# Patient Record
Sex: MALE | Race: WHITE | NOT HISPANIC OR LATINO | URBAN - METROPOLITAN AREA
[De-identification: names, ages, dates, MRNs, and addresses within clinical notes are randomized per-mention and may not be internally consistent; named-entity substitution may affect disease eponyms.]

---

## 2019-01-18 ENCOUNTER — OFFICE VISIT (OUTPATIENT)
Dept: URGENT CARE | Facility: CLINIC | Age: 44
End: 2019-01-18

## 2019-01-18 VITALS
HEART RATE: 66 BPM | HEIGHT: 72 IN | RESPIRATION RATE: 18 BRPM | DIASTOLIC BLOOD PRESSURE: 95 MMHG | BODY MASS INDEX: 32.51 KG/M2 | SYSTOLIC BLOOD PRESSURE: 140 MMHG | TEMPERATURE: 98 F | OXYGEN SATURATION: 96 % | WEIGHT: 240 LBS

## 2019-01-18 DIAGNOSIS — B96.89 BACTERIAL SINUSITIS: Primary | ICD-10-CM

## 2019-01-18 DIAGNOSIS — J02.9 PHARYNGITIS, UNSPECIFIED ETIOLOGY: ICD-10-CM

## 2019-01-18 DIAGNOSIS — J32.9 BACTERIAL SINUSITIS: Primary | ICD-10-CM

## 2019-01-18 PROCEDURE — 99203 PR OFFICE/OUTPT VISIT, NEW, LEVL III, 30-44 MIN: ICD-10-PCS | Mod: S$GLB,,, | Performed by: PHYSICIAN ASSISTANT

## 2019-01-18 PROCEDURE — 99203 OFFICE O/P NEW LOW 30 MIN: CPT | Mod: S$GLB,,, | Performed by: PHYSICIAN ASSISTANT

## 2019-01-18 RX ORDER — PREDNISONE 20 MG/1
20 TABLET ORAL DAILY
Qty: 4 TABLET | Refills: 0 | Status: SHIPPED | OUTPATIENT
Start: 2019-01-18 | End: 2019-01-22

## 2019-01-18 RX ORDER — AMOXICILLIN AND CLAVULANATE POTASSIUM 875; 125 MG/1; MG/1
1 TABLET, FILM COATED ORAL 2 TIMES DAILY
Qty: 20 TABLET | Refills: 0 | Status: SHIPPED | OUTPATIENT
Start: 2019-01-18 | End: 2019-01-28

## 2019-01-18 RX ORDER — MUPIROCIN 20 MG/G
OINTMENT TOPICAL
Qty: 22 G | Refills: 1 | Status: SHIPPED | OUTPATIENT
Start: 2019-01-18 | End: 2019-01-18

## 2019-01-18 NOTE — PROGRESS NOTES
Subjective:       Patient ID: Wilbert Calderón Jr. is a 43 y.o. male.    Vitals:  height is 6' (1.829 m) and weight is 108.9 kg (240 lb). His temperature is 97.7 °F (36.5 °C). His blood pressure is 140/95 (abnormal) and his pulse is 66. His respiration is 18 and oxygen saturation is 96%.     Chief Complaint: URI    Pt did go to ER for same sympotms last Friday. They swab for flu but came back negative. They gave pt a penicillin shot and tamiflu. Pt was starting to feel better but symptoms came back Wednesday.       URI    This is a recurrent problem. Episode onset: 2 days. The problem has been unchanged. There has been no fever. Associated symptoms include coughing, headaches, sinus pain and a sore throat. Pertinent negatives include no congestion, ear pain, nausea, neck pain, rash, sneezing, vomiting or wheezing.   Sinus Problem   This is a new problem. The current episode started 1 to 4 weeks ago. The problem has been waxing and waning since onset. There has been no fever. His pain is at a severity of 7/10. The pain is moderate. Associated symptoms include coughing, headaches, sinus pressure and a sore throat. Pertinent negatives include no chills, congestion, diaphoresis, ear pain, neck pain, shortness of breath or sneezing.       Constitution: Negative for chills, sweating, fatigue and fever.   HENT: Positive for sinus pain, sinus pressure and sore throat. Negative for ear pain, congestion and voice change.    Neck: Negative for neck pain and painful lymph nodes.   Eyes: Negative for eye redness.   Respiratory: Positive for cough. Negative for chest tightness, sputum production, bloody sputum, COPD, shortness of breath, stridor, wheezing and asthma.    Gastrointestinal: Negative for nausea and vomiting.   Musculoskeletal: Negative for muscle ache.   Skin: Negative for rash.   Allergic/Immunologic: Negative for seasonal allergies, asthma and sneezing.   Neurological: Positive for headaches.    Hematologic/Lymphatic: Negative for swollen lymph nodes.       Objective:      Physical Exam   Constitutional: He is oriented to person, place, and time. He appears well-developed and well-nourished. He is cooperative.  Non-toxic appearance. He does not appear ill. No distress.   HENT:   Head: Normocephalic and atraumatic.   Right Ear: Hearing, tympanic membrane, external ear and ear canal normal.   Left Ear: Hearing, tympanic membrane, external ear and ear canal normal.   Nose: Mucosal edema, rhinorrhea and sinus tenderness present. No nasal deformity. No epistaxis. Right sinus exhibits maxillary sinus tenderness. Right sinus exhibits no frontal sinus tenderness. Left sinus exhibits maxillary sinus tenderness. Left sinus exhibits no frontal sinus tenderness.   Mouth/Throat: Uvula is midline and mucous membranes are normal. No trismus in the jaw. Normal dentition. No uvula swelling. Posterior oropharyngeal erythema present. Tonsils are 1+ on the right. Tonsils are 1+ on the left. No tonsillar exudate.   Eyes: Conjunctivae and lids are normal. No scleral icterus.   Sclera clear bilat   Neck: Trachea normal, full passive range of motion without pain and phonation normal. Neck supple.   Cardiovascular: Normal rate, regular rhythm, normal heart sounds, intact distal pulses and normal pulses.   Pulmonary/Chest: Effort normal and breath sounds normal. No respiratory distress.   Abdominal: Soft. Normal appearance and bowel sounds are normal. He exhibits no distension. There is no tenderness.   Musculoskeletal: Normal range of motion. He exhibits no edema or deformity.   Neurological: He is alert and oriented to person, place, and time. He exhibits normal muscle tone. Coordination normal.   Skin: Skin is warm, dry and intact. He is not diaphoretic. No pallor.   Psychiatric: He has a normal mood and affect. His speech is normal and behavior is normal. Judgment and thought content normal. Cognition and memory are normal.    Nursing note and vitals reviewed.      Assessment:       1. Bacterial sinusitis    2. Pharyngitis, unspecified etiology        Plan:         Bacterial sinusitis  -     predniSONE (DELTASONE) 20 MG tablet; Take 1 tablet (20 mg total) by mouth once daily. for 4 days  Dispense: 4 tablet; Refill: 0  -     amoxicillin-clavulanate 875-125mg (AUGMENTIN) 875-125 mg per tablet; Take 1 tablet by mouth 2 (two) times daily. for 10 days  Dispense: 20 tablet; Refill: 0  -     Discontinue: mupirocin (BACTROBAN) 2 % ointment; Apply to affected area 3 times daily  Dispense: 22 g; Refill: 1    Pharyngitis, unspecified etiology      Sinusitis (Antibiotic Treatment)    The sinuses are air-filled spaces within the bones of the face. They connect to the inside of the nose. Sinusitis is an inflammation of the tissue lining the sinus cavity. Sinus inflammation can occur during a cold. It can also be due to allergies to pollens and other particles in the air. Sinusitis can cause symptoms of sinus congestion and fullness. A sinus infection causes fever, headache and facial pain. There is often green or yellow drainage from the nose or into the back of the throat (post-nasal drip). You have been given antibiotics to treat this condition.  Home care:  · Take the full course of antibiotics as instructed. Do not stop taking them, even if you feel better.  · Drink plenty of water, hot tea, and other liquids. This may help thin mucus. It also may promote sinus drainage.  · Heat may help soothe painful areas of the face. Use a towel soaked in hot water. Or,  the shower and direct the hot spray onto your face. Using a vaporizer along with a menthol rub at night may also help.   · An expectorant containing guaifenesin may help thin the mucus and promote drainage from the sinuses.  · Over-the-counter decongestants may be used unless a similar medicine was prescribed. Nasal sprays work the fastest. Use one that contains phenylephrine or  oxymetazoline. First blow the nose gently. Then use the spray. Do not use these medicines more often than directed on the label or symptoms may get worse. You may also use tablets containing pseudoephedrine. Avoid products that combine ingredients, because side effects may be increased. Read labels. You can also ask the pharmacist for help. (NOTE: Persons with high blood pressure should not use decongestants. They can raise blood pressure.)  · Over-the-counter antihistamines may help if allergies contributed to your sinusitis.    · Do not use nasal rinses or irrigation during an acute sinus infection, unless told to by your health care provider. Rinsing may spread the infection to other sinuses.  · Use acetaminophen or ibuprofen to control pain, unless another pain medicine was prescribed. (If you have chronic liver or kidney disease or ever had a stomach ulcer, talk with your doctor before using these medicines. Aspirin should never be used in anyone under 18 years of age who is ill with a fever. It may cause severe liver damage.)  · Don't smoke. This can worsen symptoms.  Follow-up care  Follow up with your healthcare provider or our staff if you are not improving within the next week.  When to seek medical advice  Call your healthcare provider if any of these occur:  · Facial pain or headache becoming more severe  · Stiff neck  · Unusual drowsiness or confusion  · Swelling of the forehead or eyelids  · Vision problems, including blurred or double vision  · Fever of 100.4ºF (38ºC) or higher, or as directed by your healthcare provider  · Seizure  · Breathing problems  · Symptoms not resolving within 10 days  Date Last Reviewed: 4/13/2015 © 2000-2017 [employer]. [address] [place] [address], [place] [address]. All rights reserved. This information is not intended as a substitute for professional medical care. Always follow your healthcare professional's instructions.      Please follow up with your Primary  care provider within 2-5 days if your signs and symptoms have not resolved or worsen.     If your condition worsens or fails to improve we recommend that you receive another evaluation at the emergency room immediately or contact your primary medical clinic to discuss your concerns.   You must understand that you have received an Urgent Care treatment only and that you may be released before all of your medical problems are known or treated. You, the patient, will arrange for follow up care as instructed.     RED FLAGS/WARNING SYMPTOMS DISCUSSED WITH PATIENT THAT WOULD WARRANT EMERGENT MEDICAL ATTENTION. PATIENT VERBALIZED UNDERSTANDING.

## 2019-01-18 NOTE — PATIENT INSTRUCTIONS
Sinusitis (Antibiotic Treatment)    The sinuses are air-filled spaces within the bones of the face. They connect to the inside of the nose. Sinusitis is an inflammation of the tissue lining the sinus cavity. Sinus inflammation can occur during a cold. It can also be due to allergies to pollens and other particles in the air. Sinusitis can cause symptoms of sinus congestion and fullness. A sinus infection causes fever, headache and facial pain. There is often green or yellow drainage from the nose or into the back of the throat (post-nasal drip). You have been given antibiotics to treat this condition.  Home care:  · Take the full course of antibiotics as instructed. Do not stop taking them, even if you feel better.  · Drink plenty of water, hot tea, and other liquids. This may help thin mucus. It also may promote sinus drainage.  · Heat may help soothe painful areas of the face. Use a towel soaked in hot water. Or,  the shower and direct the hot spray onto your face. Using a vaporizer along with a menthol rub at night may also help.   · An expectorant containing guaifenesin may help thin the mucus and promote drainage from the sinuses.  · Over-the-counter decongestants may be used unless a similar medicine was prescribed. Nasal sprays work the fastest. Use one that contains phenylephrine or oxymetazoline. First blow the nose gently. Then use the spray. Do not use these medicines more often than directed on the label or symptoms may get worse. You may also use tablets containing pseudoephedrine. Avoid products that combine ingredients, because side effects may be increased. Read labels. You can also ask the pharmacist for help. (NOTE: Persons with high blood pressure should not use decongestants. They can raise blood pressure.)  · Over-the-counter antihistamines may help if allergies contributed to your sinusitis.    · Do not use nasal rinses or irrigation during an acute sinus infection, unless told to by  your health care provider. Rinsing may spread the infection to other sinuses.  · Use acetaminophen or ibuprofen to control pain, unless another pain medicine was prescribed. (If you have chronic liver or kidney disease or ever had a stomach ulcer, talk with your doctor before using these medicines. Aspirin should never be used in anyone under 18 years of age who is ill with a fever. It may cause severe liver damage.)  · Don't smoke. This can worsen symptoms.  Follow-up care  Follow up with your healthcare provider or our staff if you are not improving within the next week.  When to seek medical advice  Call your healthcare provider if any of these occur:  · Facial pain or headache becoming more severe  · Stiff neck  · Unusual drowsiness or confusion  · Swelling of the forehead or eyelids  · Vision problems, including blurred or double vision  · Fever of 100.4ºF (38ºC) or higher, or as directed by your healthcare provider  · Seizure  · Breathing problems  · Symptoms not resolving within 10 days  Date Last Reviewed: 4/13/2015  © 6183-1722 [employer]. [address] [place] [address]. All rights reserved. This information is not intended as a substitute for professional medical care. Always follow your healthcare professional's instructions.      Please follow up with your Primary care provider within 2-5 days if your signs and symptoms have not resolved or worsen.     If your condition worsens or fails to improve we recommend that you receive another evaluation at the emergency room immediately or contact your primary medical clinic to discuss your concerns.   You must understand that you have received an Urgent Care treatment only and that you may be released before all of your medical problems are known or treated. You, the patient, will arrange for follow up care as instructed.     RED FLAGS/WARNING SYMPTOMS DISCUSSED WITH PATIENT THAT WOULD WARRANT EMERGENT MEDICAL ATTENTION. PATIENT  VERBALIZED UNDERSTANDING.

## 2024-08-21 ENCOUNTER — OFFICE VISIT (OUTPATIENT)
Dept: URGENT CARE | Facility: CLINIC | Age: 49
End: 2024-08-21
Payer: COMMERCIAL

## 2024-08-21 VITALS
SYSTOLIC BLOOD PRESSURE: 163 MMHG | DIASTOLIC BLOOD PRESSURE: 89 MMHG | BODY MASS INDEX: 32.51 KG/M2 | HEIGHT: 72 IN | HEART RATE: 79 BPM | OXYGEN SATURATION: 97 % | TEMPERATURE: 98 F | RESPIRATION RATE: 17 BRPM | WEIGHT: 240 LBS

## 2024-08-21 DIAGNOSIS — M25.571 ACUTE RIGHT ANKLE PAIN: ICD-10-CM

## 2024-08-21 DIAGNOSIS — S93.401A SPRAIN OF RIGHT ANKLE, UNSPECIFIED LIGAMENT, INITIAL ENCOUNTER: Primary | ICD-10-CM

## 2024-08-21 PROCEDURE — 73610 X-RAY EXAM OF ANKLE: CPT | Mod: FY,RT,S$GLB, | Performed by: RADIOLOGY

## 2024-08-21 PROCEDURE — 99203 OFFICE O/P NEW LOW 30 MIN: CPT | Mod: S$GLB,,,

## 2024-08-21 PROCEDURE — 73630 X-RAY EXAM OF FOOT: CPT | Mod: FY,RT,S$GLB, | Performed by: RADIOLOGY

## 2024-08-21 RX ORDER — NAPROXEN 500 MG/1
500 TABLET ORAL 2 TIMES DAILY WITH MEALS
Qty: 30 TABLET | Refills: 0 | Status: SHIPPED | OUTPATIENT
Start: 2024-08-21

## 2024-08-21 NOTE — LETTER
August 21, 2024      [facility] [address] [facility]  [place] [address]  Phone: [phone]  Fax: [phone]       Patient: Wilbert Calderón   YOB: 1975  Date of Visit: 08/21/2024    To Whom It May Concern:    Teresa Calderón  was at [facility] on 08/21/2024. The patient may return to work/school on August 23rd, 2024 or sooner with no restrictions. If you have any questions or concerns, or if I can be of further assistance, please do not hesitate to contact me.    Sincerely,          Adolfo Orozco PA-C

## 2024-08-21 NOTE — PROGRESS NOTES
"Subjective:      Patient ID: Wilbert Calderón Jr. is a 49 y.o. male.    Vitals:  height is 6' (1.829 m) and weight is 108.9 kg (240 lb). His oral temperature is 97.8 °F (36.6 °C). His blood pressure is 163/89 (abnormal) and his pulse is 79. His respiration is 17 and oxygen saturation is 97%.     Chief Complaint: Ankle Pain    Pt is a 48 yo male presenting with R ankle pain. Onset of symptoms was yesterday after stepping down from a ladder yesterday at work.  Pt reports using OTC Ibuprofen with no relief. Denies any injury to this ankle before in the past, but has been experiencing an intermittent "shocking pain" in the same area for about 1 year. Denies any known cause to this. Wife present during exam.    Ankle Pain   The incident occurred 12 to 24 hours ago. The incident occurred at home. The injury mechanism was a twisting injury. The pain is present in the right ankle. The quality of the pain is described as aching. The pain is at a severity of 8/10. The pain is severe. The pain has been Constant since onset. Associated symptoms include an inability to bear weight and muscle weakness. Pertinent negatives include no loss of motion, loss of sensation, numbness or tingling.       Constitution: Negative for activity change, appetite change, chills, fatigue and fever.   HENT:  Negative for ear pain, congestion, postnasal drip, sinus pain, sinus pressure and sore throat.    Neck: Negative for neck pain and neck swelling.   Cardiovascular:  Negative for chest pain and sob on exertion.   Eyes:  Negative for eye trauma, eye discharge and eye redness.   Respiratory:  Negative for cough, shortness of breath and wheezing.    Gastrointestinal:  Negative for abdominal pain, nausea, vomiting, constipation and diarrhea.   Genitourinary:  Negative for dysuria, frequency, urgency and urine decreased.   Musculoskeletal:  Positive for pain and joint swelling. Negative for joint pain, abnormal ROM of joint and muscle ache. "   Skin:  Negative for color change, rash, laceration and erythema.   Neurological:  Negative for dizziness, light-headedness, altered mental status and numbness.   Psychiatric/Behavioral:  Negative for altered mental status and confusion.       Objective:     Physical Exam   Constitutional: He is oriented to person, place, and time. He appears well-developed. He is cooperative. No distress.      Comments:Pt sitting erect on examination table. No acute respiratory distress, no use of accessory muscles, no notice of nasal flaring.        HENT:   Head: Normocephalic and atraumatic.   Nose: Nose normal. No rhinorrhea or congestion.   Mouth/Throat: Oropharynx is clear and moist and mucous membranes are normal. Mucous membranes are moist. Oropharynx is clear.   Eyes: Conjunctivae and lids are normal. Pupils are equal, round, and reactive to light. Extraocular movement intact   Neck: Trachea normal and phonation normal. Neck supple.   Cardiovascular: Normal rate, regular rhythm, normal heart sounds and normal pulses.   Pulmonary/Chest: Effort normal and breath sounds normal. No accessory muscle usage. No apnea, no tachypnea and no bradypnea. No respiratory distress.   Abdominal: Normal appearance and bowel sounds are normal. He exhibits no mass. Soft.   Musculoskeletal: Normal range of motion.         General: No deformity. Normal range of motion.      Right ankle: He exhibits swelling. He exhibits normal range of motion, no deformity, no laceration and normal pulse. Tenderness.      Right lower leg: He exhibits no tenderness and no swelling.        Legs:       Right foot: No tenderness or swelling.        Feet:    Neurological: He is alert and oriented to person, place, and time. He has normal strength and normal reflexes. No sensory deficit.   Skin: Skin is warm, dry, intact and not diaphoretic. not right ankleNo erythema   Psychiatric: His speech is normal and behavior is normal. Judgment and thought content normal.    Nursing note and vitals reviewed.  X-Ray Foot Complete 3 view Right    Result Date: 8/21/2024  EXAMINATION: XR FOOT COMPLETE 3 VIEW RIGHT CLINICAL HISTORY: . Pain in right ankle and joints of right foot TECHNIQUE: AP, lateral, and oblique views of the right foot were performed. COMPARISON: None FINDINGS: No definite evidence of acute fracture or dislocation.  Lisfranc joint congruent.  Joint spaces maintained.  No evidence of radiopaque foreign body.  Enthesopathic change at the calcaneus.  Minor degenerative spurring dorsal midfoot. No radiopaque foreign body.     No convincing evidence of acute fracture or dislocation. Electronically signed by: Errol Gill Date:    08/21/2024 Time:    09:30    XR ANKLE COMPLETE 3 VIEW RIGHT    Result Date: 8/21/2024  EXAMINATION: XR ANKLE COMPLETE 3 VIEW RIGHT CLINICAL HISTORY: Pain in right ankle and joints of right foot TECHNIQUE: AP, lateral, and oblique images of the right ankle were performed. COMPARISON: None FINDINGS: No fracture or dislocation.  No bone destruction identified.  Small bony spur seen arising from the calcaneus     See above Electronically signed by: Nathan Mujica MD Date:    08/21/2024 Time:    09:12       Assessment:     1. Sprain of right ankle, unspecified ligament, initial encounter    2. Acute right ankle pain        Plan:     I have reviewed the patient chart and pertinent past imaging/labs.  eGFR (2023): >105     Sprain of right ankle, unspecified ligament, initial encounter  -     naproxen (NAPROSYN) 500 MG tablet; Take 1 tablet (500 mg total) by mouth 2 (two) times daily with meals.  Dispense: 30 tablet; Refill: 0    Acute right ankle pain  -     XR ANKLE COMPLETE 3 VIEW RIGHT; Future; Expected date: 08/21/2024  -     X-Ray Foot Complete 3 view Right; Future; Expected date: 08/21/2024  -     BANDAGE ELASTIC 4IN ACE NS  -     Ambulatory referral/consult to Podiatry

## 2024-08-21 NOTE — PATIENT INSTRUCTIONS
Sprain may take up to 6 weeks to heal.  Pain: Naproxen every 12 hours as needed. May alternate with Tylenol every 4-6 hours as needed (up to 1000mg at a time).  Swelling: Ice area 2-3 times a day (2 minutes on 20 minutes off). Apply compression wrap while mobile. Elevate leg above heart.  Follow up with foot doctor as needed: [phone]  Please drink plenty of fluids.  Please get plenty of rest.  Please return here or go to the Emergency Department for any concerns or worsening of condition.  If you were prescribed a narcotic medication, do not drive or operate heavy equipment or machinery while taking these medications.  If you were not prescribed an anti-inflammatory medication, and if you do not have any history of stomach/intestinal ulcers, or kidney disease, or are not taking a blood thinner such as Coumadin, Plavix, Pradaxa, Eloquis, or Xaralta for example, it is OK to take over the counter Ibuprofen or Advil or Motrin or Aleve as directed.  Do not take these medications on an empty stomach.  Rest, ice, compression and elevation to the affected joint or limb as needed.  Please follow up with your primary care doctor or specialist as needed.    If you  smoke, please stop smoking.

## 2024-08-22 ENCOUNTER — OFFICE VISIT (OUTPATIENT)
Dept: PODIATRY | Facility: CLINIC | Age: 49
End: 2024-08-22
Payer: COMMERCIAL

## 2024-08-22 VITALS
DIASTOLIC BLOOD PRESSURE: 70 MMHG | HEIGHT: 72 IN | BODY MASS INDEX: 32.55 KG/M2 | SYSTOLIC BLOOD PRESSURE: 114 MMHG | HEART RATE: 72 BPM

## 2024-08-22 DIAGNOSIS — S93.401A MODERATE RIGHT ANKLE SPRAIN, INITIAL ENCOUNTER: Primary | ICD-10-CM

## 2024-08-22 DIAGNOSIS — S86.301A PERONEAL TENDON INJURY, RIGHT, INITIAL ENCOUNTER: ICD-10-CM

## 2024-08-22 PROCEDURE — 3008F BODY MASS INDEX DOCD: CPT | Mod: CPTII,S$GLB,, | Performed by: PODIATRIST

## 2024-08-22 PROCEDURE — 1159F MED LIST DOCD IN RCRD: CPT | Mod: CPTII,S$GLB,, | Performed by: PODIATRIST

## 2024-08-22 PROCEDURE — 99204 OFFICE O/P NEW MOD 45 MIN: CPT | Mod: S$GLB,,, | Performed by: PODIATRIST

## 2024-08-22 PROCEDURE — 3074F SYST BP LT 130 MM HG: CPT | Mod: CPTII,S$GLB,, | Performed by: PODIATRIST

## 2024-08-22 PROCEDURE — 1160F RVW MEDS BY RX/DR IN RCRD: CPT | Mod: CPTII,S$GLB,, | Performed by: PODIATRIST

## 2024-08-22 PROCEDURE — 99999 PR PBB SHADOW E&M-EST. PATIENT-LVL III: CPT | Mod: PBBFAC,,, | Performed by: PODIATRIST

## 2024-08-22 PROCEDURE — 3078F DIAST BP <80 MM HG: CPT | Mod: CPTII,S$GLB,, | Performed by: PODIATRIST

## 2024-08-22 NOTE — PROGRESS NOTES
Subjective:     Patient ID: Wilbert Calderón Jr. is a 49 y.o. male.    Chief Complaint: Foot Problem (Right ft., shooting pain by the heel, has problems on and off, xray done at urgent care yesterday) and Ankle Pain    Presents from urgent care follow-up after rolling over his right ankle when descending from a ladder 2 days ago.  States that he only felt minor discomfort initially and then a few hours later had moderate to severe pain.  Right foot x-ray was negative for fracture.  States that he gets sharp pain that radiates up the leg when he steps on the lateral border of the left foot.  He has a prior history chronic recurring mild pain near this region that is much worse now.  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  Taking naproxen which helps for somewhat for pain.  Was given Ace wrap but no Cam boot.  He was able to find a crutch to help release some pressure to the area.    Vitals:    08/22/24 1602   BP: 114/70   Pulse: 72   Height: 6' (1.829 m)   PainSc:   8   PainLoc: Foot      History reviewed. No pertinent past medical history.    History reviewed. No pertinent surgical history.    Family History   Problem Relation Name Age of Onset    No Known Problems Mother      No Known Problems Father         Social History     Socioeconomic History    Marital status: Single   Tobacco Use    Smoking status: Every Day     Types: Vaping with nicotine   Substance and Sexual Activity    Alcohol use: No    Drug use: No     Social Determinants of Health     Financial Resource Strain: Low Risk  (4/5/2022)    Received from [facility]    Overall Financial Resource Strain (CARDIA)     Difficulty of Paying Living Expenses: Not hard at all   Food Insecurity: No Food Insecurity (4/5/2022)    Received from [facility]    Hunger Vital Sign     Worried About Running Out of Food in the Last Year: Never true     Ran Out of Food in the Last Year: Never true    Transportation Needs: No Transportation Needs (4/5/2022)    Received from [facility]    PRAPARE - Transportation     Lack of Transportation (Medical): No     Lack of Transportation (Non-Medical): No   Physical Activity: Sufficiently Active (4/5/2022)    Received from [facility]    Exercise Vital Sign     Days of Exercise per Week: 5 days     Minutes of Exercise per Session: 120 min   Stress: No Stress Concern Present (4/5/2022)    Received from [facility]    [employer] [place] [employer] - Occupational Stress Questionnaire     Feeling of Stress : Not at all       Current Outpatient Medications   Medication Sig Dispense Refill    naproxen (NAPROSYN) 500 MG tablet Take 1 tablet (500 mg total) by mouth 2 (two) times daily with meals. 30 tablet 0     No current facility-administered medications for this visit.       Review of patient's allergies indicates:  No Known Allergies      Review of Systems   Constitutional: Negative for chills, fever and malaise/fatigue.   Cardiovascular:  Negative for chest pain, claudication and leg swelling.   Respiratory:  Negative for cough and shortness of breath.    Musculoskeletal:  Positive for joint pain. Negative for back pain, muscle cramps and muscle weakness.   Gastrointestinal:  Negative for nausea and vomiting.   Neurological:  Negative for numbness, paresthesias and weakness.   Psychiatric/Behavioral:  Negative for altered mental status.         Objective:     Physical Exam  Constitutional:       General: He is not in acute distress.     Appearance: Normal appearance. He is not ill-appearing.   Cardiovascular:      Pulses:           Dorsalis pedis pulses are 2+ on the right side and 2+ on the left side.        Posterior tibial pulses are 2+ on the right side and 2+ on the left side.   Musculoskeletal:      Comments: Pain on palpation posterior to the lateral malleolus right ankle overlying the peroneal tendons that is aggravated with attempted eversion of the  right foot.  Moderate pain on palpation plantar to the cuboid region which is aggravated by attempted midtarsal joint range motion and with attempted plantar flexion and eversion of the right foot.  There is localized edema to the right foot and ankle.  There is some guarding with attempted subtalar joint range motion right foot.    Left foot and ankle were assess for comparison noting normal subtalar joint and ankle range motion.   Neurological:      Mental Status: He is alert.           Assessment:      Encounter Diagnoses   Name Primary?    Moderate right ankle sprain, initial encounter Yes    Peroneal tendon injury, right, initial encounter      Plan:     Wilbert was seen today for foot problem and ankle pain.    Diagnoses and all orders for this visit:    Moderate right ankle sprain, initial encounter  -     Walking Boot For Home Use  -     MRI Ankle Without Contrast Right; Future    Peroneal tendon injury, right, initial encounter  -     Walking Boot For Home Use  -     MRI Ankle Without Contrast Right; Future      I counseled the patient on his conditions, their implications and medical management.    Reviewed left foot x-ray noting no convincing evidence for fracture foot noting significant overlap between the calcaneus and talus with disruption of the cyma line indicating significant flatfoot deformity.    Suspect either a tear of the peroneus longus tendon along the inferior aspect of the cuboid with possible disruption retromalleolar and/or possible stress fracture/injury to the cuboid in addition to mild acute inversion sprain of the ATF ligament right ankle.  MRI ordered to further assess and determine appropriate treatment plan.    Patient was dispensed, fitted and applied tall Cam boot to help offload the pressure to the right foot.  Patient to wear at all times while weight-bearing.  Rest, ice and elevation p.r.n..  Continue naproxen as needed.    Note dispensed on the patient's behalf prevent him  from returned to work at this time secondary to the injury.    RTC within 3 weeks to re-evaluate or p.r.n. as discussed    Assisted by Len Olguin DPM PGY 3    A portion of this note was generated by voice recognition software and may contain spelling and grammar errors.  .

## 2024-08-28 ENCOUNTER — HOSPITAL ENCOUNTER (OUTPATIENT)
Dept: RADIOLOGY | Facility: HOSPITAL | Age: 49
Discharge: HOME | End: 2024-08-28
Attending: PODIATRIST
Payer: COMMERCIAL

## 2024-08-28 DIAGNOSIS — S93.401A MODERATE RIGHT ANKLE SPRAIN, INITIAL ENCOUNTER: ICD-10-CM

## 2024-08-28 DIAGNOSIS — S86.301A PERONEAL TENDON INJURY, RIGHT, INITIAL ENCOUNTER: ICD-10-CM

## 2024-08-28 PROCEDURE — 73721 MRI JNT OF LWR EXTRE W/O DYE: CPT | Mod: 26,RT,, | Performed by: RADIOLOGY

## 2024-08-28 PROCEDURE — 73721 MRI JNT OF LWR EXTRE W/O DYE: CPT | Mod: TC,RT

## 2024-08-30 ENCOUNTER — PATIENT MESSAGE (OUTPATIENT)
Dept: PODIATRY | Facility: HOSPITAL | Age: 49
End: 2024-08-30
Payer: COMMERCIAL

## 2024-09-05 ENCOUNTER — OFFICE VISIT (OUTPATIENT)
Dept: PODIATRY | Facility: CLINIC | Age: 49
End: 2024-09-05
Payer: COMMERCIAL

## 2024-09-05 VITALS
WEIGHT: 240.06 LBS | HEART RATE: 65 BPM | HEIGHT: 72 IN | BODY MASS INDEX: 32.52 KG/M2 | DIASTOLIC BLOOD PRESSURE: 81 MMHG | SYSTOLIC BLOOD PRESSURE: 122 MMHG

## 2024-09-05 DIAGNOSIS — M67.471 GANGLION CYST OF RIGHT FOOT: ICD-10-CM

## 2024-09-05 DIAGNOSIS — M25.571 SINUS TARSI SYNDROME, RIGHT: ICD-10-CM

## 2024-09-05 DIAGNOSIS — S86.311D PERONEAL TENDON TEAR, RIGHT, SUBSEQUENT ENCOUNTER: ICD-10-CM

## 2024-09-05 DIAGNOSIS — S93.401A MODERATE RIGHT ANKLE SPRAIN, INITIAL ENCOUNTER: Primary | ICD-10-CM

## 2024-09-05 PROCEDURE — 3008F BODY MASS INDEX DOCD: CPT | Mod: CPTII,S$GLB,, | Performed by: PODIATRIST

## 2024-09-05 PROCEDURE — 3074F SYST BP LT 130 MM HG: CPT | Mod: CPTII,S$GLB,, | Performed by: PODIATRIST

## 2024-09-05 PROCEDURE — 1159F MED LIST DOCD IN RCRD: CPT | Mod: CPTII,S$GLB,, | Performed by: PODIATRIST

## 2024-09-05 PROCEDURE — 99999 PR PBB SHADOW E&M-EST. PATIENT-LVL III: CPT | Mod: PBBFAC,,, | Performed by: PODIATRIST

## 2024-09-05 PROCEDURE — 1160F RVW MEDS BY RX/DR IN RCRD: CPT | Mod: CPTII,S$GLB,, | Performed by: PODIATRIST

## 2024-09-05 PROCEDURE — 99214 OFFICE O/P EST MOD 30 MIN: CPT | Mod: S$GLB,,, | Performed by: PODIATRIST

## 2024-09-05 PROCEDURE — 3079F DIAST BP 80-89 MM HG: CPT | Mod: CPTII,S$GLB,, | Performed by: PODIATRIST

## 2024-09-05 NOTE — PROGRESS NOTES
"Subjective:     Patient ID: Wilbert Calderón Jr. is a 49 y.o. male.    Chief Complaint: Ankle Pain (Right. On Monday his ankle"gave out" and he collapsed on the steps.  He was swollen Monday and Tuesday and Wednesday he has a decrease in swelling and pain. )    Presents from urgent care follow-up after rolling over his right ankle when descending from a ladder 2 days ago.  States that he only felt minor discomfort initially and then a few hours later had moderate to severe pain.  Right foot x-ray was negative for fracture.  States that he gets sharp pain that radiates up the leg when he steps on the lateral border of the left foot.  He has a prior history chronic recurring mild pain near this region that is much worse now.  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  Taking naproxen which helps for somewhat for pain.  Was given Ace wrap but no Cam boot.  He was able to find a crutch to help release some pressure to the area.    09/05/2024:  Follow-up from MRI right ankle.  Ambulating with cam boot which seems to be helping.  Accompanied by his wife.    Vitals:    09/05/24 0955   BP: 122/81   Pulse: 65   Weight: 108.9 kg (240 lb 1.3 oz)   Height: 6' (1.829 m)   PainSc:   4      No past medical history on file.    No past surgical history on file.    Family History   Problem Relation Name Age of Onset    No Known Problems Mother      No Known Problems Father         Social History     Socioeconomic History    Marital status: Single   Tobacco Use    Smoking status: Every Day     Types: Vaping with nicotine   Substance and Sexual Activity    Alcohol use: No    Drug use: No     Social Determinants of Health     Financial Resource Strain: Low Risk  (4/5/2022)    Received from [facility]    Overall Financial Resource Strain (CARDIA)     Difficulty of Paying Living Expenses: Not hard at all   Food Insecurity: No Food Insecurity (4/5/2022)    [facility]    Hunger Vital Sign     Worried About Running Out of Food in the Last Year: Never true     Ran Out of Food in the Last Year: Never true   Transportation Needs: No Transportation Needs (4/5/2022)    Received from [facility]    PRAPARE - Transportation     Lack of Transportation (Medical): No     Lack of Transportation (Non-Medical): No   Physical Activity: Sufficiently Active (4/5/2022)    Received from [facility]    Exercise Vital Sign     Days of Exercise per Week: 5 days     Minutes of Exercise per Session: 120 min   Stress: No Stress Concern Present (4/5/2022)    Received from [facility]    [employer] [place] [employer] - Occupational Stress Questionnaire     Feeling of Stress : Not at all       Current Outpatient Medications   Medication Sig Dispense Refill    naproxen (NAPROSYN) 500 MG tablet Take 1 tablet (500 mg total) by mouth 2 (two) times daily with meals. 30 tablet 0     No current facility-administered medications for this visit.       Review of patient's allergies indicates:  No Known Allergies      Review of Systems   Constitutional: Negative for chills, fever and malaise/fatigue.   Cardiovascular:  Negative for chest pain, claudication and leg swelling.   Respiratory:  Negative for cough and shortness of breath.    Musculoskeletal:  Positive for joint pain. Negative for back pain, muscle cramps and muscle weakness.   Gastrointestinal:  Negative for nausea and vomiting.   Neurological:  Negative for numbness, paresthesias and weakness.   Psychiatric/Behavioral:  Negative for altered mental status.         Objective:     Physical Exam  Constitutional:       General: He is not in acute distress.     Appearance: Normal appearance. He is not ill-appearing.   Cardiovascular:      Pulses:           Dorsalis pedis pulses are 2+ on the right side and 2+ on the left side.        Posterior tibial pulses are 2+ on the right side and 2+ on the left side.   Musculoskeletal:      Comments: Pain  on palpation posterior to the lateral malleolus right ankle overlying the peroneal tendons that is aggravated with attempted eversion of the right foot.  Palpable subcutaneous mass posterior to the lateral malleolus right ankle that causes the patient discomfort.  Today there is no pain on palpation in the region of the cuboid along the plantar lateral right midfoot.  Previous edema is nearly resolved to the right foot and ankle.  There is localized pain on palpation to the sinus tarsi right ankle and some mild audible clicking with subtalar joint range motion which is stiff and limited compared to left side.    Mild localized pain on palpation along the superior aspect of the tarsal tunnel medial right hindfoot.    Left foot and ankle were assess for comparison noting normal subtalar joint and ankle range motion.   Neurological:      Mental Status: He is alert.           Assessment:      Encounter Diagnoses   Name Primary?    Moderate right ankle sprain, initial encounter Yes    Peroneal tendon tear, right, subsequent encounter     Ganglion cyst of right foot     Sinus tarsi syndrome, right      Plan:     Wilbert was seen today for ankle pain.    Diagnoses and all orders for this visit:    Moderate right ankle sprain, initial encounter    Peroneal tendon tear, right, subsequent encounter    Ganglion cyst of right foot    Sinus tarsi syndrome, right      I counseled the patient on his conditions, their implications and medical management.    Reviewed left foot x-ray noting no convincing evidence for fracture foot noting significant overlap between the calcaneus and talus with disruption of the cyma line indicating significant flatfoot deformity.    Reviewed MRI findings in detail which had shown partial tears of the peroneus longus and brevis in addition to large ganglionic cyst at the posterior lateral aspect of the right ankle/subtalar joint.  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    We discussed continued conservative care such as further protective weight-bearing and immobilization with a cam boot for an additional 4 weeks to assess of the peroneal tendons can heal on their own.  In addition we discussed possible surgical intervention consisting of excising the large ganglionic cyst which is most likely causing some stenosis and preventing significant movement along the peroneal tendons.  Patient may eventually require arthrodesis of the subtalar joint but we could try steroid injection to the sinus tarsi if he remains symptomatic.    RTC within 4 weeks to re-evaluate or p.r.n. as discussed    Assisted by Joaquin Rouse DPM PGY 2    A portion of this note was generated by voice recognition software and may contain spelling and grammar errors.  .

## 2024-10-07 ENCOUNTER — OFFICE VISIT (OUTPATIENT)
Dept: PODIATRY | Facility: CLINIC | Age: 49
End: 2024-10-07
Payer: COMMERCIAL

## 2024-10-07 VITALS
HEART RATE: 62 BPM | SYSTOLIC BLOOD PRESSURE: 133 MMHG | BODY MASS INDEX: 32.52 KG/M2 | WEIGHT: 240.06 LBS | HEIGHT: 72 IN | DIASTOLIC BLOOD PRESSURE: 80 MMHG

## 2024-10-07 DIAGNOSIS — S86.311D PERONEAL TENDON TEAR, RIGHT, SUBSEQUENT ENCOUNTER: ICD-10-CM

## 2024-10-07 DIAGNOSIS — S93.401D MODERATE RIGHT ANKLE SPRAIN, SUBSEQUENT ENCOUNTER: Primary | ICD-10-CM

## 2024-10-07 DIAGNOSIS — M67.471 GANGLION CYST OF RIGHT FOOT: ICD-10-CM

## 2024-10-07 DIAGNOSIS — G57.51 TARSAL TUNNEL SYNDROME, RIGHT: ICD-10-CM

## 2024-10-07 DIAGNOSIS — M25.571 SINUS TARSI SYNDROME, RIGHT: ICD-10-CM

## 2024-10-07 PROCEDURE — 99999 PR PBB SHADOW E&M-EST. PATIENT-LVL III: CPT | Mod: PBBFAC,,, | Performed by: PODIATRIST

## 2024-10-07 PROCEDURE — 1159F MED LIST DOCD IN RCRD: CPT | Mod: CPTII,S$GLB,, | Performed by: PODIATRIST

## 2024-10-07 PROCEDURE — 3075F SYST BP GE 130 - 139MM HG: CPT | Mod: CPTII,S$GLB,, | Performed by: PODIATRIST

## 2024-10-07 PROCEDURE — 1160F RVW MEDS BY RX/DR IN RCRD: CPT | Mod: CPTII,S$GLB,, | Performed by: PODIATRIST

## 2024-10-07 PROCEDURE — 3008F BODY MASS INDEX DOCD: CPT | Mod: CPTII,S$GLB,, | Performed by: PODIATRIST

## 2024-10-07 PROCEDURE — 3079F DIAST BP 80-89 MM HG: CPT | Mod: CPTII,S$GLB,, | Performed by: PODIATRIST

## 2024-10-07 PROCEDURE — 99214 OFFICE O/P EST MOD 30 MIN: CPT | Mod: S$GLB,,, | Performed by: PODIATRIST

## 2024-10-07 NOTE — PROGRESS NOTES
Subjective:     Patient ID: Wilbert Calderón Jr. is a 49 y.o. male.    Chief Complaint: Ankle Pain (right)    Presents from urgent care follow-up after rolling over his right ankle when descending from a ladder 2 days ago.  States that he only felt minor discomfort initially and then a few hours later had moderate to severe pain.  Right foot x-ray was negative for fracture.  States that he gets sharp pain that radiates up the leg when he steps on the lateral border of the left foot.  He has a prior history chronic recurring mild pain near this region that is much worse now.  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  Taking naproxen which helps for somewhat for pain.  Was given Ace wrap but no Cam boot.  He was able to find a crutch to help release some pressure to the area.    09/05/2024:  Follow-up from MRI right ankle.  Ambulating with cam boot which seems to be helping.  Accompanied by his wife.    10/07/2024:  Follow-up for moderate right ankle sprain with peroneal tendon tear.  States that he wore his cam boot up to 2 weeks ago and has returned to work.  Relates that as modify his activities at work.    Vitals:    10/07/24 0952   BP: 133/80   Pulse: 62   Weight: 108.9 kg (240 lb 1.3 oz)   Height: 6' (1.829 m)   PainSc: 0-No pain      History reviewed. No pertinent past medical history.    History reviewed. No pertinent surgical history.    Family History   Problem Relation Name Age of Onset    No Known Problems Mother      No Known Problems Father         Social History     Socioeconomic History    Marital status: Single   Tobacco Use    Smoking status: Every Day     Types: Vaping with nicotine   Substance and Sexual Activity    Alcohol use: No    Drug use: No     Social Drivers of Health     Financial Resource Strain: Low Risk  (4/5/2022)    Received from [facility]    Overall Financial Resource Strain (CARDIA)     Difficulty of Paying Living  Expenses: Not hard at all   Food Insecurity: No Food Insecurity (4/5/2022)    Received from [facility]    Hunger Vital Sign     Worried About Running Out of Food in the Last Year: Never true     Ran Out of Food in the Last Year: Never true   Transportation Needs: No Transportation Needs (4/5/2022)    Received from [facility]    PRAPARE - Transportation     Lack of Transportation (Medical): No     Lack of Transportation (Non-Medical): No   Physical Activity: Sufficiently Active (4/5/2022)    Received from [facility]    Exercise Vital Sign     Days of Exercise per Week: 5 days     Minutes of Exercise per Session: 120 min   Stress: No Stress Concern Present (4/5/2022)    Received from [facility]    [employer] [place] [employer] - Occupational Stress Questionnaire     Feeling of Stress : Not at all       Current Outpatient Medications   Medication Sig Dispense Refill    naproxen (NAPROSYN) 500 MG tablet Take 1 tablet (500 mg total) by mouth 2 (two) times daily with meals. 30 tablet 0     No current facility-administered medications for this visit.       Review of patient's allergies indicates:  No Known Allergies      Review of Systems   Constitutional: Negative for chills, fever and malaise/fatigue.   Cardiovascular:  Negative for chest pain, claudication and leg swelling.   Respiratory:  Negative for cough and shortness of breath.    Musculoskeletal:  Positive for joint pain. Negative for back pain, muscle cramps and muscle weakness.   Gastrointestinal:  Negative for nausea and vomiting.   Neurological:  Negative for numbness, paresthesias and weakness.   Psychiatric/Behavioral:  Negative for altered mental status.         Objective:     Physical Exam  Constitutional:       General: He is not in acute distress.     Appearance: Normal appearance. He is not ill-appearing.   Cardiovascular:      Pulses:           Dorsalis pedis pulses are 2+ on the right side and 2+ on the left side.        Posterior  tibial pulses are 2+ on the right side and 2+ on the left side.   Musculoskeletal:      Comments: Today there is not appear to be any direct pain on palpation overlying the course of the peroneal tendons right ankle but when performing active resisted eversion of the right foot in neutral position he does have some pain overlying the peroneal tendons distal to the lateral malleolus near the calcaneocuboid joint and he notices a pain in the tarsal tunnel area consisting of pins and needles sensation.  Palpable subcutaneous mass posterior to the lateral malleolus right ankle that causes the patient discomfort.  No significant edema to the right foot and ankle. No pain on palpation overlying the sinus tarsi right foot.  No pain with attempted subtalar joint range motion..    Pain on palpation along the mid aspect of the tarsal tunnel right foot that does not shoot or radiate.  Positive Tinel sign overlying the tarsal tunnel.    Left foot and ankle were assess for comparison noting normal subtalar joint and ankle range motion.   Neurological:      Mental Status: He is alert.           Assessment:      Encounter Diagnoses   Name Primary?    Moderate right ankle sprain, subsequent encounter Yes    Peroneal tendon tear, right, subsequent encounter     Ganglion cyst of right foot     Sinus tarsi syndrome, right     Tarsal tunnel syndrome, right      Plan:     Wilbert was seen today for ankle pain.    Diagnoses and all orders for this visit:    Moderate right ankle sprain, subsequent encounter  -     ORTHOPEDIC BRACING FOR HOME USE - LOWER EXTREMITY  -     Ambulatory referral/consult to Physical/Occupational Therapy; Future    Peroneal tendon tear, right, subsequent encounter  -     ORTHOPEDIC BRACING FOR HOME USE - LOWER EXTREMITY  -     Ambulatory referral/consult to Physical/Occupational Therapy; Future    Ganglion cyst of right foot    Sinus tarsi syndrome, right  -     ORTHOPEDIC BRACING FOR HOME USE - LOWER  EXTREMITY    Tarsal tunnel syndrome, right  -     Ambulatory referral/consult to Physical/Occupational Therapy; Future      I counseled the patient on his conditions, their implications and medical management.    Left foot x-ray revealed no convincing evidence for fracture foot noting significant overlap between the calcaneus and talus with disruption of the cyma line indicating significant flatfoot deformity.    Reviewed MRI findings again in detail which had shown partial tears of the peroneus longus and brevis in addition to large ganglionic cyst at the posterior lateral aspect of the right ankle/subtalar joint.  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    From a clinical perspective the previous tear to the peroneus longus and brevis appears to be healing well with minimal residual symptoms.  We discussed transitioning to an ankle brace to help support his right foot and ankle for the next 4 weeks and then he can gradually transition out of the brace.  Referral was also placed to physical therapy for gradual strengthening and condition to the right lower extremity.  Consider modalities along the tarsal tunnel such as electrical stimulation and/or iontophoresis to help reduce the irritation to the posterior tibial nerve.    RTC within 6-8 weeks to re-evaluate or p.r.n. as discussed.  We briefly discussed that the ganglionic cyst may need to be excised if it continues to give him symptoms in addition to tarsal tunnel release.    Assisted by Joaquin Rouse DPM PGY 2    A portion of this note was generated by voice recognition software and may contain spelling and grammar errors.  .

## 2024-10-08 ENCOUNTER — CLINICAL SUPPORT (OUTPATIENT)
Dept: REHABILITATION | Facility: HOSPITAL | Age: 49
End: 2024-10-08
Attending: PODIATRIST
Payer: COMMERCIAL

## 2024-10-08 DIAGNOSIS — R53.1 DECREASED STRENGTH: Primary | ICD-10-CM

## 2024-10-08 DIAGNOSIS — S86.311D PERONEAL TENDON TEAR, RIGHT, SUBSEQUENT ENCOUNTER: ICD-10-CM

## 2024-10-08 DIAGNOSIS — G57.51 TARSAL TUNNEL SYNDROME, RIGHT: ICD-10-CM

## 2024-10-08 DIAGNOSIS — S93.401D MODERATE RIGHT ANKLE SPRAIN, SUBSEQUENT ENCOUNTER: ICD-10-CM

## 2024-10-08 DIAGNOSIS — R26.9 ABNORMAL GAIT: ICD-10-CM

## 2024-10-08 PROCEDURE — 97530 THERAPEUTIC ACTIVITIES: CPT | Mod: PN

## 2024-10-08 PROCEDURE — 97162 PT EVAL MOD COMPLEX 30 MIN: CPT | Mod: PN

## 2024-10-08 NOTE — PLAN OF CARE
[facility]   Physical Therapy Initial Evaluation      Name: Wilbert Calderón Jr.  Clinic Number: [id number]    Therapy Diagnosis:   Encounter Diagnoses   Name Primary?    Moderate right ankle sprain, subsequent encounter     Peroneal tendon tear, right, subsequent encounter     Tarsal tunnel syndrome, right         Physician: Stanton Amor DPM    Physician Orders: PT Eval and Treat   Medical Diagnosis from Referral:   S93.401D (ICD-10-CM) - Moderate right ankle sprain, subsequent encounter   S86.311D (ICD-10-CM) - Peroneal tendon tear, right, subsequent encounter   G57.51 (ICD-10-CM) - Tarsal tunnel syndrome, right     Evaluation Date: 10/8/2024  Authorization Period Expiration: 12/31/2024  Plan of Care Expiration: 11/14/2024  Progress Note Due: 10/25/2024  Date of Surgery: none   Visit # / Visits authorized: 1/20  FOTO: 1/5    Precautions: Standard     Time In: 11:07 am   Time Out: 12:00 pm   Total Billable Time: 53 minutes    Subjective     Date of onset: 1 year with acute episodes following stepping off ladder     History of current condition - Wilbert reports  he has been having ankle issues for the past year, localized to inner ankle and top of his foot. Described as a numbness/tingling session and sharp, shooting pain. Late August, he stepped back off a ladder and twisted his ankle in an inversion position. He was in tall CAM boot for about 1 month. Just transitioned out of the CAM boot about 1 week ago. Now has ankle brace. No instability in ankle brace. Hx of left total hip arthroplasty last year.    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 Working full-time but taking it easy. Duties: walking, lifting, carrying, pushing.   Prior Level of Function: Used to work out daily at the gym about 5x/week since ankle injury    Current Level of Function: independent      Pain:  Current 4/10, worst 5/10, best 0/10   Location:   right medial aspect of ankle   Description: Throbbing  Aggravating Factors: Standing, Walking  Easing Factors: ankle brace, suboxone     Patients goals: return to work without difficulty and returning to gym      Medical History:   No past medical history on file.    Surgical History:   Wilbert Calderón Jr.  has no past surgical history on file.    Medications:   Wilbert has a current medication list which includes the following prescription(s): naproxen.    Allergies:   Review of patient's allergies indicates:  No Known Allergies     Objective        Palpation: TTP ATFL    Posture: pes planus right > left     Gross Movement Analysis:  - Gait: antalgic with limp, toe out     Sensation: impaired light touch sensation to right throughout L4-L5 dermatomal pattern     Range of Motion:   LE Right (Active/Passive) Left (Active/Passive)   Hip flexion WNL WNL   Hip abduction WNL WNL   Hip ER WNL WNL   Knee Flexion WNL WNL   Knee Extension WNL WNL     Ankle  Right  Left Pain/Dysfunction with Movement    AROM PROM AROM PROM WNL   Plantarflexion (50 deg) 48 NT 40 NT    Dorsiflexion (20 deg) 12 NT 12 NT    Inversion (20-30 deg) 25 NT 28 NT    Eversion (5-10 deg) 22 NT 25 NT        Lower Extremity Strength                 LE           Right           Left   Dorsiflexion 5/5 5/5   Plantarflexion 5/5 5/5   Inversion 4+/5 5/5   Eversion  NT 5/5   Great Toe 5/5 5/5     Special Tests:                           Right           Left   Anterior Drawer - NT   Eversion Stress Test + NT   Tinnels Test (TT syndrome) + NT   High Ankle Sprain Test - NT     Flexibility:    Gastroc-soleus complex: tightness present     Joint Mobility: Talocrural hypomobility noted     Intake Outcome Measure for FOTO ankle Survey    Therapist reviewed FOTO scores for Wilbert Calderón Jr. on 10/8/2024.   FOTO report - see Media section or FOTO account episode details.    Intake Score: 47%    "      Treatment     Total Treatment time (time-based codes) separate from Evaluation: 15 minutes     Wilbert received the treatments listed below:      therapeutic activities to improve functional performance for 15  minutes, including:  Towel scrunches: 2 minutes   Arch lifts: 20x5"  Ankle plantarflexion and dorsiflexion isometrics: 10x5"  Education on ankle sprains, tarsal tunnel syndrome, tissue healing model, pes planus     Patient Education and Home Exercises     Education provided:   - Findings; prognosis and plan of care  - Home exercise program  - Modality options  - Therapist contact information      Written Home Exercises Provided: yes.  Exercises were reviewed and Wilbert was able to demonstrate them prior to the end of the session.  Wilbert demonstrated good understanding of the education provided. See EMR under Patient Instructions for exercises provided during therapy sessions.    Assessment     Wilbert is a 49 y.o. male referred to outpatient Physical Therapy with a medical diagnosis of S93.401D (ICD-10-CM) - Moderate right ankle sprain, subsequent encounter, S86.311D (ICD-10-CM) - Peroneal tendon tear, right, subsequent encounter, and G57.51 (ICD-10-CM) - Tarsal tunnel syndrome, right. Patient presents with signs and symptoms consistent with medical diagnosis. Tarsal tunnel symptoms more bothersome than ankle sprain and peroneal tearing. Avoided resisted ankle eversion to allowing for continued tissue healing. Would benefit from tibial nerve glides but unable to tolerate ankle eversion active range of motion pain-free at this time. Will benefit from physical therapy services to improve ankle mobility, ankle stabilization, reduce neural tension, and improve gait mechanics to return to prior level of function.      Patient prognosis is Excellent.   Patient will benefit from skilled outpatient Physical Therapy to address the deficits stated above and in the chart below, provide patient /family " education, and to maximize patientt's level of independence.     Plan of care discussed with patient: yes  Patient's spiritual, cultural and educational needs considered and patient is agreeable to the plan of care and goals as stated below:     Anticipated Barriers for therapy: none     Medical Necessity is demonstrated by the following  History  Co-morbidities and personal factors that may impact the plan of care [] LOW: no personal factors / co-morbidities  [x] MODERATE: 1-2 personal factors / co-morbidities  [] HIGH: 3+ personal factors / co-morbidities    Moderate / High Support Documentation:   Co-morbidities affecting plan of care: High BMI    Personal Factors:   coping style  social background  lifestyle     Examination  Body Structures and Functions, activity limitations and participation restrictions that may impact the plan of care [] LOW: addressing 1-2 elements  [x] MODERATE: 3+ elements  [] HIGH: 4+ elements (please support below)    Moderate / High Support Documentation: Based on PMHX, co morbidities , data from assessments and functional level of assistance required with task and clinical presentation directly impacting function.         Clinical Presentation [] LOW: stable  [x] MODERATE: Evolving  [] HIGH: Unstable     Decision Making/ Complexity Score: moderate       Goals:  Short Term Goals (3 Weeks):  1. Patient will be compliant with home exercise program to supplement therapy in restoring pain free function.  2. Patient will improve impaired lower extremity manual muscle tests to >/= 4/5 to improve dynamic hip/knee support for functional tasks.  3. Patient will improve dorsiflexion range of motion to 15 degrees to improve stair negotiation.  4. Patient will no longer need lace up ankle brace for ambulation to return to prior level of function.     Long Term Goals (6 Weeks):  1. Patient will improve FOTO score to </= 23% limited to decrease perceived limitation with mobility.   2. Patient will  improve impaired lower extremity manual muscle tests to >/= 4+/5 to improve dynamic hip/knee support for functional tasks.  3. Patient will be able to tolerate full work day without increased ankle pain to improve function and quality of life.   4. Patient will return to gym routine to return to prior level of function.   5. Patient will be able to perform 20 repetitions of single leg heel raises to improve function and strength for daily tasks.     Plan     Plan of care Certification: 10/8/2024 to 11/14/2024.    Outpatient Physical Therapy 2 times weekly for 5 weeks to include the following interventions: Gait Training, Manual Therapy, Moist Heat/ Ice, Neuromuscular Re-ed, Patient Education, Self Care, Therapeutic Activities, and Therapeutic Exercise.     Nano Horton PT        Physician's Signature: _________________________________________ Date: ________________

## 2024-10-09 PROBLEM — R53.1 DECREASED STRENGTH: Status: ACTIVE | Noted: 2024-10-09

## 2024-10-09 PROBLEM — R26.9 ABNORMAL GAIT: Status: ACTIVE | Noted: 2024-10-09

## 2024-10-16 ENCOUNTER — CLINICAL SUPPORT (OUTPATIENT)
Dept: REHABILITATION | Facility: HOSPITAL | Age: 49
End: 2024-10-16
Payer: COMMERCIAL

## 2024-10-16 DIAGNOSIS — R53.1 DECREASED STRENGTH: Primary | ICD-10-CM

## 2024-10-16 DIAGNOSIS — R26.9 ABNORMAL GAIT: ICD-10-CM

## 2024-10-16 PROCEDURE — 97112 NEUROMUSCULAR REEDUCATION: CPT | Mod: PN

## 2024-10-16 PROCEDURE — 97530 THERAPEUTIC ACTIVITIES: CPT | Mod: PN

## 2024-10-24 NOTE — PROGRESS NOTES
"[facility]   Physical Therapy Treatment Note      Name: Wilbert Calderón Jr.  Clinic Number: [id number]    Therapy Diagnosis:   Encounter Diagnoses   Name Primary?    Decreased strength Yes    Abnormal gait      Physician: Stanton Amor DPM    Visit Date: 10/16/2024    Physician Orders: PT Eval and Treat   Medical Diagnosis from Referral:   S93.401D (ICD-10-CM) - Moderate right ankle sprain, subsequent encounter   S86.311D (ICD-10-CM) - Peroneal tendon tear, right, subsequent encounter   G57.51 (ICD-10-CM) - Tarsal tunnel syndrome, right      Evaluation Date: 10/8/2024  Authorization Period Expiration: 12/31/2024  Plan of Care Expiration: 11/14/2024  Progress Note Due: 10/25/2024  Date of Surgery: none   Visit # / Visits authorized: 2/20  FOTO: 1/5     Precautions: Standard      Time In: 12:05 pm   Time Out: 1:00 pm   Total Billable Time: 55 minutes  Subjective     Patient reports: his ankle has been doing a bit better recently.  He was compliant with home exercise program.  Response to previous treatment: no change   Functional change: no change     Pain: 7/10  Location: right ankle     Objective      Objective Measures updated at progress report unless specified.     Treatment     Wilbert received the treatments listed below:      therapeutic activities to improve functional performance for 15 minutes, including:   Patient education on home exercise program, plan of care, prognosis, and history of current condition  Questions and answers concerning current condition.     neuromuscular re-education activities to improve: Coordination, Kinesthetic, Sense, and Proprioception for 40 minutes. The following activities were included:  Ankle plantarflexion: 3x10 - blue theraband   Ankle inversion: 3x10 - red theraband   Ankle eversion: 3x10 - red theraband   Ankle dorsiflexion: 3x10 - green theraband   Standing gastroc fitter stretch: 3x30"  Standing soleus fitter oscillations: 20x "   Seated heel raises: 3x10 - 30#      Patient Education and Home Exercises       Education provided:   - Patient education on home exercise program, plan of care, prognosis, and history of current condition  - Questions and answers concerning current condition.      Written Home Exercises Provided: Yes. Exercises were reviewed and Wilbert was able to demonstrate them prior to the end of the session.  Wilbert demonstrated good  understanding of the education provided. See Electronic Medical Record under Patient Instructions for exercises provided during therapy sessions    Assessment   Wilbert is a 49 y.o. male referred to outpatient Physical Therapy with a medical diagnosis of S93.401D (ICD-10-CM) - Moderate right ankle sprain, subsequent encounter, S86.311D (ICD-10-CM) - Peroneal tendon tear, right, subsequent encounter, and G57.51 (ICD-10-CM) - Tarsal tunnel syndrome, right. Patient presents with signs and symptoms consistent with medical diagnosis. Tarsal tunnel symptoms more bothersome than ankle sprain and peroneal tearing. First visit following initial evaluation consisted of introduction to ankle theraband series in conjunction with ankle mobility/flexibility exercises. Planning to progress towards balance next visit.     Wilbert Is progressing well towards his goals.   Patient prognosis is Excellent.     Patient will continue to benefit from skilled outpatient physical therapy to address the deficits listed in the problem list box on initial evaluation, provide pt/family education and to maximize pt's level of independence in the home and community environment.     Patient's spiritual, cultural and educational needs considered and pt agreeable to plan of care and goals.     Anticipated barriers to physical therapy: none    Goals:   Short Term Goals (3 Weeks):  1. Patient will be compliant with home exercise program to supplement therapy in restoring pain free function.  2. Patient will improve impaired lower  extremity manual muscle tests to >/= 4/5 to improve dynamic hip/knee support for functional tasks.  3. Patient will improve dorsiflexion range of motion to 15 degrees to improve stair negotiation.  4. Patient will no longer need lace up ankle brace for ambulation to return to prior level of function.      Long Term Goals (6 Weeks):  1. Patient will improve FOTO score to </= 23% limited to decrease perceived limitation with mobility.   2. Patient will improve impaired lower extremity manual muscle tests to >/= 4+/5 to improve dynamic hip/knee support for functional tasks.  3. Patient will be able to tolerate full work day without increased ankle pain to improve function and quality of life.   4. Patient will return to gym routine to return to prior level of function.   5. Patient will be able to perform 20 repetitions of single leg heel raises to improve function and strength for daily tasks.     Plan   Right ankle stability   Right ankle proprioception  Gastroc/soleus mobility       Greg Saba, PT

## 2024-11-21 ENCOUNTER — OFFICE VISIT (OUTPATIENT)
Dept: PODIATRY | Facility: CLINIC | Age: 49
End: 2024-11-21
Payer: COMMERCIAL

## 2024-11-21 VITALS
DIASTOLIC BLOOD PRESSURE: 81 MMHG | WEIGHT: 240 LBS | SYSTOLIC BLOOD PRESSURE: 130 MMHG | BODY MASS INDEX: 32.51 KG/M2 | HEIGHT: 72 IN | HEART RATE: 74 BPM

## 2024-11-21 DIAGNOSIS — S93.401D MODERATE RIGHT ANKLE SPRAIN, SUBSEQUENT ENCOUNTER: Primary | ICD-10-CM

## 2024-11-21 DIAGNOSIS — B07.0 PLANTAR WART, LEFT FOOT: ICD-10-CM

## 2024-11-21 DIAGNOSIS — S86.311D PERONEAL TENDON TEAR, RIGHT, SUBSEQUENT ENCOUNTER: ICD-10-CM

## 2024-11-21 PROCEDURE — 3008F BODY MASS INDEX DOCD: CPT | Mod: CPTII,S$GLB,, | Performed by: PODIATRIST

## 2024-11-21 PROCEDURE — 1160F RVW MEDS BY RX/DR IN RCRD: CPT | Mod: CPTII,S$GLB,, | Performed by: PODIATRIST

## 2024-11-21 PROCEDURE — 99999 PR PBB SHADOW E&M-EST. PATIENT-LVL III: CPT | Mod: PBBFAC,,, | Performed by: PODIATRIST

## 2024-11-21 PROCEDURE — 3079F DIAST BP 80-89 MM HG: CPT | Mod: CPTII,S$GLB,, | Performed by: PODIATRIST

## 2024-11-21 PROCEDURE — 1159F MED LIST DOCD IN RCRD: CPT | Mod: CPTII,S$GLB,, | Performed by: PODIATRIST

## 2024-11-21 PROCEDURE — 17000 DESTRUCT PREMALG LESION: CPT | Mod: S$GLB,,, | Performed by: PODIATRIST

## 2024-11-21 PROCEDURE — 3075F SYST BP GE 130 - 139MM HG: CPT | Mod: CPTII,S$GLB,, | Performed by: PODIATRIST

## 2024-11-21 PROCEDURE — 99213 OFFICE O/P EST LOW 20 MIN: CPT | Mod: 25,S$GLB,, | Performed by: PODIATRIST

## 2024-11-21 RX ORDER — BUPRENORPHINE AND NALOXONE 8; 2 MG/1; MG/1
FILM, SOLUBLE BUCCAL; SUBLINGUAL
COMMUNITY

## 2024-11-21 NOTE — PROGRESS NOTES
Subjective:     Patient ID: Wilbert Calderón Jr. is a 49 y.o. male.    Chief Complaint: Ankle Pain (Follow right sprained ankle; with c/o pain to left foot)    Presents from urgent care follow-up after rolling over his right ankle when descending from a ladder 2 days ago.  States that he only felt minor discomfort initially and then a few hours later had moderate to severe pain.  Right foot x-ray was negative for fracture.  States that he gets sharp pain that radiates up the leg when he steps on the lateral border of the left foot.  He has a prior history chronic recurring mild pain near this region that is much worse now.  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  Taking naproxen which helps for somewhat for pain.  Was given Ace wrap but no Cam boot.  He was able to find a crutch to help release some pressure to the area.    09/05/2024:  Follow-up from MRI right ankle.  Ambulating with cam boot which seems to be helping.  Accompanied by his wife.    10/07/2024:  Follow-up for moderate right ankle sprain with peroneal tendon tear.  States that he wore his cam boot up to 2 weeks ago and has returned to work.  Relates that as modify his activities at work.    11/21/2024: Returns as follow-up for moderate right ankle sprain.  States that wearing the ankle brace that is significantly helped reduce his pain he attended to physical therapy sessions and was discharge.  Notes he no longer has a pain towards the medial right ankle/hindfoot region and no pain towards the outer area.  He does complain of a painful lesion that developed on the bottom of the left foot.  He has returned to work without any difficulty.  Performance maintenance work/refrigeration work.    Vitals:    11/21/24 1414   BP: 130/81   Pulse: 74   Weight: 108.9 kg (240 lb)   Height: 6' (1.829 m)   PainSc:   7   PainLoc: Foot      History reviewed. No pertinent past medical history.    History  reviewed. No pertinent surgical history.    Family History   Problem Relation Name Age of Onset    No Known Problems Mother      No Known Problems Father         Social History     Socioeconomic History    Marital status: Single   Tobacco Use    Smoking status: Every Day     Types: Vaping with nicotine   Substance and Sexual Activity    Alcohol use: No    Drug use: No     Social Drivers of Health     Financial Resource Strain: Low Risk  (2022)    Received from [facility]    Overall Financial Resource Strain (CARDIA)     Difficulty of Paying Living Expenses: Not hard at all   Food Insecurity: No Food Insecurity (2022)    Received from [facility]    Hunger Vital Sign     Worried About Running Out of Food in the Last Year: Never true     Ran Out of Food in the Last Year: Never true   Transportation Needs: No Transportation Needs (2022)    Received from [facility]    PRAPARE - Transportation     Lack of Transportation (Medical): No     Lack of Transportation (Non-Medical): No   Physical Activity: Sufficiently Active (2022)    Received from [facility]    Exercise Vital Sign     Days of Exercise per Week: 5 days     Minutes of Exercise per Session: 120 min   Stress: No Stress Concern Present (2022)    Received from [facility]    [employer] - Occupational Stress Questionnaire     Feeling of Stress : Not at all       Current Outpatient Medications   Medication Sig Dispense Refill    buprenorphine-naloxone 8-2 mg (SUBOXONE) 8-2 mg SMARTSI Strip(s) By Mouth Daily      naproxen (NAPROSYN) 500 MG tablet Take 1 tablet (500 mg total) by mouth 2 (two) times daily with meals. (Patient not taking: Reported on 2024) 30 tablet 0     No current facility-administered medications for this visit.       Review of patient's allergies indicates:  No Known Allergies      Review of Systems   Constitutional: Negative for chills, fever and malaise/fatigue.   Cardiovascular:   Negative for chest pain, claudication and leg swelling.   Respiratory:  Negative for cough and shortness of breath.    Musculoskeletal:  Positive for joint pain. Negative for back pain, muscle cramps and muscle weakness.   Gastrointestinal:  Negative for nausea and vomiting.   Neurological:  Negative for numbness, paresthesias and weakness.   Psychiatric/Behavioral:  Negative for altered mental status.         Objective:     Physical Exam  Constitutional:       General: He is not in acute distress.     Appearance: Normal appearance. He is not ill-appearing.   Cardiovascular:      Pulses:           Dorsalis pedis pulses are 2+ on the right side and 2+ on the left side.        Posterior tibial pulses are 2+ on the right side and 2+ on the left side.   Musculoskeletal:      Comments: No pain on palpation along the tarsal tunnel medial right hindfoot.  No pain along the course of the peroneal tendons and no pain with range motion or manual muscle strength testing right foot and ankle.  Negative anterior drawer sign right ankle.  No pain stress eversion or inversion right ankle.    Note semi-rigid cavovarus foot type bilateral with moderate forefoot varus left compared to the right.  Enlarged styloid process left versus right.     Skin:     General: Skin is warm.      Capillary Refill: Capillary refill takes less than 2 seconds.      Findings: No ecchymosis or erythema.      Nails: There is no clubbing.      Comments: Raised hyperkeratotic skin sub 5th met base along the plantar lateral left midfoot with spongy core and disappearance of the resting skin tension lines.  There is pain with squeezing the lesion.   Neurological:      Mental Status: He is alert.           Assessment:      Encounter Diagnoses   Name Primary?    Moderate right ankle sprain, subsequent encounter Yes    Peroneal tendon tear, right, subsequent encounter     Plantar wart, left foot      Plan:     Wilbert was seen today for ankle pain.    Diagnoses  and all orders for this visit:    Moderate right ankle sprain, subsequent encounter    Peroneal tendon tear, right, subsequent encounter    Plantar wart, left foot      I counseled the patient on his conditions, their implications and medical management.    Patient doing well on the right ankle.  He may use the brace as needed.  Continue increasing activity as tolerated.    With the patient's verbal consent a sterile scalpel was utilized to Pare/trim the raised hyperkeratotic skin to the plantar lateral left midfoot noting characteristics consistent with a plantar wart.  We discussed treatment options.  Patient elected to receive application of cantharidin to lesion which was covered with a Mepilex border and taped in place.  Dressings remain intact for up to 48 hours as discussed.  Home care instructions reviewed.    RTC p.r.n. as discussed.     Assisted by Neisha Davis DPM PGY 2    A portion of this note was generated by voice recognition software and may contain spelling and grammar errors.  .

## 2024-12-13 ENCOUNTER — OFFICE VISIT (OUTPATIENT)
Dept: URGENT CARE | Facility: CLINIC | Age: 49
End: 2024-12-13
Payer: COMMERCIAL

## 2024-12-13 VITALS
DIASTOLIC BLOOD PRESSURE: 90 MMHG | RESPIRATION RATE: 16 BRPM | BODY MASS INDEX: 31.83 KG/M2 | SYSTOLIC BLOOD PRESSURE: 150 MMHG | WEIGHT: 235 LBS | HEART RATE: 62 BPM | TEMPERATURE: 98 F | OXYGEN SATURATION: 99 % | HEIGHT: 72 IN

## 2024-12-13 DIAGNOSIS — K08.89 TOOTH PAIN: ICD-10-CM

## 2024-12-13 DIAGNOSIS — R51.9 ACUTE NONINTRACTABLE HEADACHE, UNSPECIFIED HEADACHE TYPE: Primary | ICD-10-CM

## 2024-12-13 RX ORDER — KETOROLAC TROMETHAMINE 30 MG/ML
30 INJECTION, SOLUTION INTRAMUSCULAR; INTRAVENOUS
Status: COMPLETED | OUTPATIENT
Start: 2024-12-13 | End: 2024-12-13

## 2024-12-13 RX ORDER — IBUPROFEN 800 MG/1
800 TABLET ORAL 3 TIMES DAILY
Qty: 42 TABLET | Refills: 0 | Status: SHIPPED | OUTPATIENT
Start: 2024-12-13 | End: 2024-12-27

## 2024-12-13 RX ORDER — AMOXICILLIN 500 MG/1
CAPSULE ORAL
COMMUNITY

## 2024-12-13 RX ADMIN — KETOROLAC TROMETHAMINE 30 MG: 30 INJECTION, SOLUTION INTRAMUSCULAR; INTRAVENOUS at 08:12

## 2024-12-13 NOTE — PROGRESS NOTES
"Subjective:      Patient ID: Wilbert Calderón Jr. is a 49 y.o. male.    Vitals:  height is 6' 0.01" (1.829 m) and weight is 106.6 kg (235 lb). His oral temperature is 98 °F (36.7 °C). His blood pressure is 150/90 (abnormal) and his pulse is 62. His respiration is 16 and oxygen saturation is 99%.     Chief Complaint: Headache    This is a 49 y.o male who presents today with chief complaint of headaches, chills, and dizziness that started last night.   Wife reports that patient's blood pressure was 148/88 today 30 mins PTA.   Patient reports he had a tooth pulled last week, has been taking Amoxicillin and reports mild pain.     Provider note    48 yo M c/o headache, dizziness since last night. Denies nvd, blurred vision, fever. Pt had a tooth pulled last week and started pcn a couple days ago. He is also experiencing sinus congestion and pressure in his ears. He has not taken anything otc. Denies worst h/a of his life. Reports hx of migraines, treated with otc medication. Pt reports he is on suboxone. Denies hx of htn.       Headache   This is a new problem. The current episode started yesterday. The pain is located in the Frontal region. The pain does not radiate. The quality of the pain is described as aching. The pain is at a severity of 5/10. Associated symptoms include dizziness and sinus pressure. Pertinent negatives include no abdominal pain, abnormal behavior, anorexia, back pain, blurred vision, coughing, drainage, ear pain, eye pain, eye redness, eye watering, facial sweating, fever, hearing loss, insomnia, loss of balance, muscle aches, nausea, neck pain, numbness, phonophobia, photophobia, rhinorrhea, scalp tenderness, seizures, sore throat, swollen glands, tingling, tinnitus, visual change, vomiting, weakness or weight loss. Treatments tried: Amoxicillin. His past medical history is significant for migraine headaches.       Constitution: Negative for fever.   HENT:  Positive for congestion and sinus " pressure. Negative for ear pain, tinnitus, hearing loss and sore throat.    Neck: Negative for neck pain.   Eyes:  Negative for eye pain, eye redness, photophobia and blurred vision.   Respiratory:  Negative for cough.    Gastrointestinal:  Negative for abdominal pain, nausea, vomiting, constipation and diarrhea.   Musculoskeletal:  Negative for back pain.   Neurological:  Positive for dizziness, headaches and history of migraines. Negative for history of vertigo, loss of balance, numbness, tingling and seizures.   Psychiatric/Behavioral:  The patient does not have insomnia.       Objective:     Physical Exam   Constitutional: He is oriented to person, place, and time. normal  HENT:   Head: Normocephalic and atraumatic.   Ears:   Right Ear: External ear and ear canal normal. A middle ear effusion is present.   Left Ear: External ear and ear canal normal. A middle ear effusion is present. no impacted cerumen  Nose: Congestion present. No rhinorrhea.   Mouth/Throat: Uvula is midline, oropharynx is clear and moist and mucous membranes are normal. No oral lesions. No trismus in the jaw. No dental abscesses or uvula swelling. No oropharyngeal exudate, posterior oropharyngeal edema, posterior oropharyngeal erythema, tonsillar abscesses or cobblestoning. Tonsils are 0 on the right. Tonsils are 0 on the left. No tonsillar exudate.   Eyes: Conjunctivae are normal.   Abdominal: Normal appearance.   Musculoskeletal: Normal range of motion.         General: Normal range of motion.   Neurological: He is alert and oriented to person, place, and time.   Skin: Skin is warm and dry.       Assessment:     1. Acute nonintractable headache, unspecified headache type    2. Tooth pain        Plan:       Acute nonintractable headache, unspecified headache type  -     ketorolac injection 30 mg  -     ibuprofen (ADVIL,MOTRIN) 800 MG tablet; Take 1 tablet (800 mg total) by mouth 3 (three) times daily. for 14 days  Dispense: 42 tablet;  Refill: 0    Tooth pain        Patient with a normal neuro exam, no thunderclap onset of headache, well appearance, I doubt SAH.  Normal neuro exam, recurrent headache, doubt brain tumor.  No meningeal signs to suggest meningitis.  Provided with return precautions.     We appreciate you trusting us with your medical care. We hope you feel better soon. We will be happy to take care of you for all of your future medical needs.  You must understand that you've received an Urgent Care treatment only and that you may be released before all your medical problems are known or treated. You, the patient, will arrange for follow up care as instructed.  Follow up with your PCP or specialty clinic as directed in the next 1-2 weeks if not improved or as needed.  You can call [phone] to schedule an appointment with the appropriate provider.  Another option is to follow up with [facility] ([email or web address]) virtually for quick simple medical advice.  If your condition worsens we recommend that you receive another evaluation at the emergency room immediately or contact your primary medical clinics after hours call service to discuss your concerns.  Please return here or go to the Emergency Department for any concerns or worsening of condition.

## 2024-12-13 NOTE — PATIENT INSTRUCTIONS
Start daily antihistamine such as allegra, claritin, zyrtec for sinus congestion.  Alternate tylenol and ibuprofen for headache.  Follow up with your dentist for tooth pain.

## 2025-02-11 ENCOUNTER — TELEPHONE (OUTPATIENT)
Dept: PODIATRY | Facility: CLINIC | Age: 50
End: 2025-02-11
Payer: COMMERCIAL

## 2025-02-11 NOTE — TELEPHONE ENCOUNTER
Called his wife, Renu, regarding her message.  She states that his foot is red and swollen along the instep.  This has been going on for the last 2 weeks.  We have and opening tomorrow with Dr. Allen.  She accepted the appt tomorrow with Dr. Allen for 11:00 on 02/12/25.  Patient's wife has no more questions at this time.     Did advise the patient to go to the Emergency room,  he declined he would like to see another Dr instead per his wife.     Bhargavi

## 2025-02-12 ENCOUNTER — HOSPITAL ENCOUNTER (OUTPATIENT)
Dept: RADIOLOGY | Facility: HOSPITAL | Age: 50
Discharge: HOME | End: 2025-02-12
Attending: STUDENT IN AN ORGANIZED HEALTH CARE EDUCATION/TRAINING PROGRAM
Payer: COMMERCIAL

## 2025-02-12 ENCOUNTER — OFFICE VISIT (OUTPATIENT)
Dept: PODIATRY | Facility: CLINIC | Age: 50
End: 2025-02-12
Payer: COMMERCIAL

## 2025-02-12 VITALS — HEART RATE: 75 BPM | DIASTOLIC BLOOD PRESSURE: 88 MMHG | SYSTOLIC BLOOD PRESSURE: 141 MMHG

## 2025-02-12 DIAGNOSIS — M79.672 LEFT FOOT PAIN: ICD-10-CM

## 2025-02-12 DIAGNOSIS — M79.672 LEFT FOOT PAIN: Primary | ICD-10-CM

## 2025-02-12 PROCEDURE — 73610 X-RAY EXAM OF ANKLE: CPT | Mod: 26,LT,, | Performed by: RADIOLOGY

## 2025-02-12 PROCEDURE — 73610 X-RAY EXAM OF ANKLE: CPT | Mod: TC,FY,LT

## 2025-02-12 PROCEDURE — 73630 X-RAY EXAM OF FOOT: CPT | Mod: 26,LT,, | Performed by: RADIOLOGY

## 2025-02-12 PROCEDURE — 73630 X-RAY EXAM OF FOOT: CPT | Mod: TC,FY,LT

## 2025-02-12 PROCEDURE — 99999 PR PBB SHADOW E&M-EST. PATIENT-LVL III: CPT | Mod: PBBFAC,,, | Performed by: STUDENT IN AN ORGANIZED HEALTH CARE EDUCATION/TRAINING PROGRAM

## 2025-02-12 RX ORDER — METHYLPREDNISOLONE 4 MG/1
TABLET ORAL
Qty: 1 EACH | Refills: 0 | Status: SHIPPED | OUTPATIENT
Start: 2025-02-12 | End: 2025-03-05

## 2025-02-12 NOTE — PROGRESS NOTES
Subjective:     Patient ID: Wilbert Calderón Jr. is a 49 y.o. male.    Chief Complaint: Foot Pain (Left foot pain)    Wilbert is a 49 y.o. male who presents to the podiatry clinic  with complaint of  left foot pain. Onset of the symptoms was several weeks ago. Precipitating event: none known. Current symptoms include:  pain that started out of nowhere, points to top of left foot and to medial ankle. Relates pain is so bad he cannot let his sheets touch his foot in bed, however is slowly improving today . Aggravating factors:  none known . Symptoms have gradually improved. Patient has had prior foot problems. Evaluation to date: none. Treatment to date: none. Patients rates pain  moderate/severe  on pain scale. Occasional tingling pain to top of foot as well. Denies lower back pain    Review of Systems   Constitutional: Negative for chills, decreased appetite, diaphoresis and fever.   HENT:  Negative for congestion and hearing loss.    Cardiovascular:  Negative for chest pain, claudication, leg swelling and syncope.   Respiratory:  Negative for cough and shortness of breath.    Skin:  Positive for suspicious lesions. Negative for color change, flushing, itching, poor wound healing and rash.   Musculoskeletal:  Positive for joint pain. Negative for joint swelling.   Gastrointestinal:  Negative for nausea and vomiting.   Neurological:  Negative for focal weakness, paresthesias and weakness.   Psychiatric/Behavioral:  Negative for altered mental status. The patient is not nervous/anxious.         Objective:     Physical Exam  Constitutional:       General: He is not in acute distress.     Appearance: Normal appearance. He is well-developed. He is not diaphoretic.   Cardiovascular:      Comments: Dorsalis pedis and posterior tibial pulses are within normal limits. Skin temperature is within normal limits. Toes are cool to touch and feet are warm proximally. Hair growth is within normal limits. Skin is normotrophic  and without hyperpigmentation. No edema noted. No varicosities or spider veins noted, bilaterally.   Musculoskeletal:         General: Tenderness present.      Comments: Adequate joint range of motion without pain, limitation, nor crepitation to foot and ankle joints. Muscle strength is 5/5 in all groups bilaterally.    Tenderness to dorsal left hindfoot and posterior medial ankle.      Feet:      Right foot:      Skin integrity: Dry skin present. No ulcer, blister, erythema or warmth.      Left foot:      Skin integrity: Dry skin present. No ulcer, blister, erythema or warmth.   Skin:     General: Skin is warm and dry.      Capillary Refill: Capillary refill takes less than 2 seconds.      Comments: Skin is warm and dry, no acute signs of infection noted bilaterally      Toenails well trimmed and of normal morphology    Otherwise, no open wounds, macerations or hyperkeratotic lesions, bilaterally            Neurological:      Mental Status: He is alert and oriented to person, place, and time.      Sensory: Sensory deficit present.      Motor: No abnormal muscle tone.      Comments: Light touch within normal limits. Subjective numbness/tingling to dorsal left foot.    Psychiatric:         Mood and Affect: Mood normal.         Behavior: Behavior normal.         Thought Content: Thought content normal.           Assessment:      Encounter Diagnosis   Name Primary?    Left foot pain Yes     Plan:     Wilbert was seen today for foot pain.    Diagnoses and all orders for this visit:    Left foot pain  -     Uric acid; Future  -     X-Ray Foot Complete Left; Future  -     X-Ray Ankle Complete Left; Future    Other orders  -     methylPREDNISolone (MEDROL DOSEPACK) 4 mg tablet; use as directed      I counseled the patient on his conditions, their implications and medical management.  Baseline xray ordered  Discussed possible gout based on symptoms. Uric acid level ordered. Rx Medrol dosepack  Nutrition/dietary guidelines  dispensed to avoid gout flair up  Return to clinic in 1 mo, sooner PRN